# Patient Record
Sex: FEMALE | Race: WHITE | NOT HISPANIC OR LATINO
[De-identification: names, ages, dates, MRNs, and addresses within clinical notes are randomized per-mention and may not be internally consistent; named-entity substitution may affect disease eponyms.]

---

## 2017-02-09 PROBLEM — Z00.00 ENCOUNTER FOR PREVENTIVE HEALTH EXAMINATION: Status: ACTIVE | Noted: 2017-02-09

## 2023-05-18 ENCOUNTER — APPOINTMENT (OUTPATIENT)
Dept: OBGYN | Facility: CLINIC | Age: 53
End: 2023-05-18

## 2023-09-08 ENCOUNTER — NON-APPOINTMENT (OUTPATIENT)
Age: 53
End: 2023-09-08

## 2023-09-08 ENCOUNTER — APPOINTMENT (OUTPATIENT)
Dept: OBGYN | Facility: CLINIC | Age: 53
End: 2023-09-08
Payer: COMMERCIAL

## 2023-09-08 VITALS
DIASTOLIC BLOOD PRESSURE: 81 MMHG | BODY MASS INDEX: 27 KG/M2 | SYSTOLIC BLOOD PRESSURE: 145 MMHG | HEART RATE: 77 BPM | HEIGHT: 61 IN | WEIGHT: 143 LBS

## 2023-09-08 DIAGNOSIS — N83.201 UNSPECIFIED OVARIAN CYST, RIGHT SIDE: ICD-10-CM

## 2023-09-08 DIAGNOSIS — R23.2 FLUSHING: ICD-10-CM

## 2023-09-08 DIAGNOSIS — Z01.419 ENCOUNTER FOR GYNECOLOGICAL EXAMINATION (GENERAL) (ROUTINE) W/OUT ABNORMAL FINDINGS: ICD-10-CM

## 2023-09-08 DIAGNOSIS — N20.0 CALCULUS OF KIDNEY: ICD-10-CM

## 2023-09-08 PROCEDURE — 76830 TRANSVAGINAL US NON-OB: CPT

## 2023-09-08 PROCEDURE — 99396 PREV VISIT EST AGE 40-64: CPT | Mod: 25

## 2023-09-08 RX ORDER — ROSUVASTATIN CALCIUM 5 MG/1
5 TABLET, FILM COATED ORAL
Refills: 0 | Status: ACTIVE | COMMUNITY

## 2023-09-08 NOTE — HISTORY OF PRESENT ILLNESS
[Hot Flashes] : hot flashes [FreeTextEntry1] : here for her annual last vistit 3 years ago hot flashes severe LAST YADIRA NOTE; Patient Name	TIFFANIE DOWD (50yo, F) ID# [id number]	Appt. Date/Time	2020 11:20AM 	1970	Service Dept.	[facility] OFFICE Provider	THIAGO CRANE MD Insurance	 Med Primary: [facility] Insurance # : [id number] Policy/Group # : [id number] Prescription: [facility] - Member is eligible. details Chief Complaint Well Woman Visit  HX OF CYST Patient's Care Team Primary Care Provider: JIHAN HERNANDEZ MD: [address] [place] [id number], Ph [phone], Fax [phone] NPI: [id number] Patient's Pharmacies [id number] ([facility] [address] [place] [address], Ph [phone], Fax [phone] Vitals 2020 11:37 am Ht:	5 ft 1 in Wt:	140 lbs BMI:	26.5 BP:	122/74 sitting Allergies Reviewed Allergies OMNICEF	 Medications Reviewed Medications atorvastatin 40 mg tablet 19   filled	[facility] azithromycin 250 mg tablet 18   filled	[facility] cefdinir 300 mg capsule 01/15/15   filled	[facility] celecoxib 200 mg capsule 19   filled	[facility] ciprofloxacin 500 mg tablet 17   filled	[facility] fluocinolone acetonide oiL 0.01 % ear drops 14   filled	[facility] ibuprofen 600 mg tablet 14   filled	[facility] metoprolol succinate ER 25 mg tablet,extended release 24 hr 03/15/20   filled	[facility] raNITIdine 300 mg capsule 19   filled	[facility] raNITIdine 300 mg tablet 20   filled	[facility] sulfamethoxazole 800 mg-trimethoprim 160 mg tablet 19   filled	[facility] tamsulosin 0.4 mg capsule 18   filled	[facility] Problems Reviewed Problems Uterine leiomyoma Gynecologic examination Family History Reviewed Family History Mother	- Malignant tumor of thyroid gland ( age: 60) Father	- Diabetes mellitus Social History Reviewed Social History Routine Gyn Tobacco Smoking Status: Former smoker (Notes: quit 20 y/ago) Most Recent Tobacco Use Screenin2020 Surgical History Surgical History not reviewed (last reviewed 2019) Hysterectomy - robotic sc hyst Breast Biopsy - benign Dilation and Curettage GYN History Reviewed GYN History Duration of Flow (days): 7. LMP: Definite. Frequency of Cycle (Q days): 25. Menses Monthly: Y. Flow: Heavy (Notes: gone!!!!!). Date of LMP: 2014 (Notes: ). Obstetric History Reviewed Obstetric History TOTAL	FULL	PRE	AB. I	AB. S	ECTOPICS	MULTIPLE	LIVING 2	2						2  Past Medical History Past Medical History not reviewed (last reviewed 2019) Screening None recorded. HPI routine gyn exam ROS Patient reports no fatigue, no fever, no significant weight gain, and no significant weight loss. She reports no abnormal moles and no rashes. She reports no irritation and no vision changes. She reports no hearing loss, no ear pain, no nose/sinus problems, no sore throat, no snoring, no dry mouth, and no mouth ulcers. She reports no dyspnea / shortness of breath, no cough, no sputum production, no hemoptysis, and no wheezing. She reports no chest pain, no palpitations, and no orthopnea. She reports no heartburn, no dysphagia, no nausea, no vomiting, no abdominal pain, no bowel movement changes, no diarrhea, no constipation, and no rectal bleeding. She reports no hematuria, no abnormal bleeding, no flank pain, no trouble urinating, no incontinence, no rash, no lesion, no discharge, no vaginal odor, and no vaginal itching. She reports no menstrual problems and no PMDD symptoms. She reports no menopausal symptoms. She reports no sexual problems. She reports no muscle aches, no muscle weakness, no arthralgias/joint pain, and no back pain. She reports no headaches, no dizziness, no LOC, no weakness, no numbness, and no seizures. She reports no depression, no alcoholism, and no sleep disturbances. Physical Exam Patient is a 49-year-old female.  Chaperone: Chaperone: present.  Female Genitalia: Vulva: no masses, atrophy, or lesions. Bladder/Urethra: no urethral discharge or mass and normal meatus and bladder non distended. Vagina no tenderness, erythema, cystocele, rectocele, abnormal vaginal discharge, or vesicle(s) or ulcers. Cervix: no discharge or cervical motion tenderness and grossly normal. Uterus: normal size and shape and midline, mobile, non-tender, and no uterine prolapse. Adnexa/Parametria: no parametrial tenderness or mass and no adnexal tenderness or ovarian mass.  Breast: Inspection/Palpation: no erythema, induration, tenderness, skin changes, abnormal secretions, or distinct masses and normal nipple appearance and non tender axillary lymph nodes.  Abdomen: Auscultation/Inspection/Palpation: no tenderness, hepatomegaly, splenomegaly, masses, or CVA tenderness and soft, non-distended, and normal bowel sounds. Hernia: none palpated. Assessment / Plan 1. Gynecologic examination - MAMMO/SONO  CHOLESTEROL BETTER  EXERCISING 5 DAYS PER WEEK/DIETING  Z01.411: Encounter for gynecological examination (general) (routine) with abnormal findings PAP, LB  2. Cyst of left ovary - GONE/MENOPAUSE  N83.202: Unspecified ovarian cyst, left side  3. Cyst of right ovary - SMALL/2 COMPONENTS  4.2CC  OBSERVE  N83.201: Unspecified ovarian cyst, right side US, TRANSVAGINAL  US, TRANSVAGINAL  Review of us, transvaginal taken on 2020 at [facility] OFFICE shows: Imaging Studies: Indications: ovarian cyst. Uterus: absent but volume (cc): ___. Cervix: normal. Cul de sac: no fluid was demonstrated. Right Ovary: volume (cc): 4.3 and right ovarian cysts (2 COMPONENTS, SMALL). Left Ovary: volume (cc): 1.7.  Return to Office None recorded. Encounter Sign-Off Encounter signed-off by Thiago Crane MD, 2020. Encounter performed and documented by Thiago Crane MD Encounter reviewed & signed by Thiago Crane MD on 2020 at 12:01pm Audit history   Image Documentation #[id number]   last visit   [TextBox_6] : DOES NOT WANT ESTROGEN

## 2023-09-08 NOTE — PROCEDURE
[Suspected Ovarian Cyst] : suspected ovarian cyst [Transvaginal Ultrasound] : transvaginal ultrasound [Absent] : absent [FreeTextEntry3] : CYST GONE ALL OK [FreeTextEntry7] : 1.4 CC [FreeTextEntry8] : 0.8 CC

## 2023-09-16 LAB
CYTOLOGY CVX/VAG DOC THIN PREP: NORMAL
HPV HIGH+LOW RISK DNA PNL CVX: NOT DETECTED

## 2024-06-27 ENCOUNTER — RX RENEWAL (OUTPATIENT)
Age: 54
End: 2024-06-27

## 2024-09-06 DIAGNOSIS — Z12.39 ENCOUNTER FOR OTHER SCREENING FOR MALIGNANT NEOPLASM OF BREAST: ICD-10-CM

## 2024-09-11 ENCOUNTER — APPOINTMENT (OUTPATIENT)
Dept: OBGYN | Facility: CLINIC | Age: 54
End: 2024-09-11
Payer: COMMERCIAL

## 2024-09-11 VITALS
HEIGHT: 61 IN | BODY MASS INDEX: 26.06 KG/M2 | WEIGHT: 138 LBS | DIASTOLIC BLOOD PRESSURE: 86 MMHG | HEART RATE: 75 BPM | SYSTOLIC BLOOD PRESSURE: 126 MMHG

## 2024-09-11 DIAGNOSIS — R39.89 OTHER SYMPTOMS AND SIGNS INVOLVING THE GENITOURINARY SYSTEM: ICD-10-CM

## 2024-09-11 DIAGNOSIS — R23.2 FLUSHING: ICD-10-CM

## 2024-09-11 DIAGNOSIS — Z87.891 PERSONAL HISTORY OF NICOTINE DEPENDENCE: ICD-10-CM

## 2024-09-11 DIAGNOSIS — Z01.419 ENCOUNTER FOR GYNECOLOGICAL EXAMINATION (GENERAL) (ROUTINE) W/OUT ABNORMAL FINDINGS: ICD-10-CM

## 2024-09-11 LAB
BILIRUB UR QL STRIP: NORMAL
CLARITY UR: CLEAR
COLLECTION METHOD: NORMAL
GLUCOSE UR-MCNC: NORMAL
HCG UR QL: 0.2 EU/DL
HGB UR QL STRIP.AUTO: NORMAL
KETONES UR-MCNC: NORMAL
LEUKOCYTE ESTERASE UR QL STRIP: NORMAL
NITRITE UR QL STRIP: NORMAL
PH UR STRIP: 7
PROT UR STRIP-MCNC: NORMAL
SP GR UR STRIP: 1.01

## 2024-09-11 PROCEDURE — 81003 URINALYSIS AUTO W/O SCOPE: CPT | Mod: QW

## 2024-09-11 PROCEDURE — 99396 PREV VISIT EST AGE 40-64: CPT

## 2024-09-11 PROCEDURE — 99459 PELVIC EXAMINATION: CPT

## 2024-09-11 RX ORDER — FAMOTIDINE 10 MG/1
TABLET, FILM COATED ORAL
Refills: 0 | Status: ACTIVE | COMMUNITY

## 2024-09-11 NOTE — HISTORY OF PRESENT ILLNESS
[Patient reported mammogram was normal] : Patient reported mammogram was normal [Patient reported colonoscopy was normal] : Patient reported colonoscopy was normal [Hot Flashes] : hot flashes [Night Sweats] : night sweats [Options Discussed] : options discussed [No] : Patient does not have concerns regarding sex [FreeTextEntry1] : Marley is a 54 yo here for her annual.  At her last visit, she was prescribed paroxetine for menopausal symptoms. She filled the prescription, but was worried about the side effects, so she did not start taking it. She still endorses hot flashes and night sweats that she describes as very bothersome. She denies vaginal dryness. She endorses occasional pelvic cramping that lasts for a few seconds. She last felt this 2 months ago.   Otherwise, she feels well. [Mammogramdate] : 03/2023 [PapSmeardate] : 09/2023 [ColonoscopyDate] : 2020

## 2024-09-11 NOTE — PHYSICAL EXAM
[Chaperone Present] : A chaperone was present in the examining room during all aspects of the physical examination [52916] : A chaperone was present during the pelvic exam. [Examination Of The Breasts] : a normal appearance [No Masses] : no breast masses were palpable [Labia Majora] : normal [Labia Minora] : normal [Normal] : normal [Absent] : absent [Uterine Adnexae] : normal [FreeTextEntry2] : Katya

## 2024-09-11 NOTE — PHYSICAL EXAM
[Chaperone Present] : A chaperone was present in the examining room during all aspects of the physical examination [[id number]] : A chaperone was present during the pelvic exam. [Examination Of The Breasts] : a normal appearance [No Masses] : no breast masses were palpable [Labia Majora] : normal [Labia Minora] : normal [Normal] : normal [Absent] : absent [Uterine Adnexae] : normal [FreeTextEntry2] : Katya

## 2024-09-11 NOTE — HISTORY OF PRESENT ILLNESS
[Patient reported mammogram was normal] : Patient reported mammogram was normal [Patient reported colonoscopy was normal] : Patient reported colonoscopy was normal [Hot Flashes] : hot flashes [Night Sweats] : night sweats [Options Discussed] : options discussed [No] : Patient does not have concerns regarding sex [FreeTextEntry1] : Marley is a 52 yo here for her annual.  At her last visit, she was prescribed paroxetine for menopausal symptoms. She filled the prescription, but was worried about the side effects, so she did not start taking it. She still endorses hot flashes and night sweats that she describes as very bothersome. She denies vaginal dryness. She endorses occasional pelvic cramping that lasts for a few seconds. She last felt this 2 months ago.   Otherwise, she feels well. [Mammogramdate] : 03/2023 [PapSmeardate] : 09/2023 [ColonoscopyDate] : 2020

## 2024-09-14 LAB
BACTERIA UR CULT: NORMAL
HPV HIGH+LOW RISK DNA PNL CVX: NOT DETECTED

## 2024-09-21 LAB — CYTOLOGY CVX/VAG DOC THIN PREP: ABNORMAL

## 2025-04-22 DIAGNOSIS — R92.30 DENSE BREASTS, UNSPECIFIED: ICD-10-CM

## 2025-09-17 ENCOUNTER — APPOINTMENT (OUTPATIENT)
Dept: OBGYN | Facility: CLINIC | Age: 55
End: 2025-09-17